# Patient Record
Sex: FEMALE | Race: ASIAN | NOT HISPANIC OR LATINO | Employment: UNEMPLOYED | URBAN - METROPOLITAN AREA
[De-identification: names, ages, dates, MRNs, and addresses within clinical notes are randomized per-mention and may not be internally consistent; named-entity substitution may affect disease eponyms.]

---

## 2019-08-26 ENCOUNTER — TRANSCRIBE ORDERS (OUTPATIENT)
Dept: ADMINISTRATIVE | Facility: HOSPITAL | Age: 49
End: 2019-08-26

## 2019-08-26 DIAGNOSIS — Z12.39 SCREENING BREAST EXAMINATION: Primary | ICD-10-CM

## 2019-11-05 ENCOUNTER — APPOINTMENT (OUTPATIENT)
Dept: MAMMOGRAPHY | Facility: HOSPITAL | Age: 49
End: 2019-11-05

## 2019-11-14 ENCOUNTER — HOSPITAL ENCOUNTER (OUTPATIENT)
Dept: MAMMOGRAPHY | Facility: HOSPITAL | Age: 49
Discharge: HOME | End: 2019-11-14
Admitting: NURSE PRACTITIONER

## 2019-11-14 DIAGNOSIS — Z12.39 SCREENING BREAST EXAMINATION: ICD-10-CM

## 2019-11-14 PROCEDURE — 77067 SCR MAMMO BI INCL CAD: CPT

## 2019-11-14 PROCEDURE — 77063 BREAST TOMOSYNTHESIS BI: CPT

## 2023-06-12 ENCOUNTER — CONSULT (OUTPATIENT)
Dept: ONCOLOGY | Facility: CLINIC | Age: 53
End: 2023-06-12
Payer: MEDICAID

## 2023-06-12 ENCOUNTER — LAB (OUTPATIENT)
Dept: LAB | Facility: HOSPITAL | Age: 53
End: 2023-06-12
Payer: MEDICAID

## 2023-06-12 VITALS
TEMPERATURE: 98.2 F | OXYGEN SATURATION: 96 % | HEART RATE: 74 BPM | SYSTOLIC BLOOD PRESSURE: 129 MMHG | WEIGHT: 163.6 LBS | DIASTOLIC BLOOD PRESSURE: 62 MMHG

## 2023-06-12 DIAGNOSIS — D72.829 LEUKOCYTOSIS, UNSPECIFIED TYPE: Primary | ICD-10-CM

## 2023-06-12 LAB
BASOPHILS # BLD AUTO: 0.02 10*3/MM3 (ref 0–0.2)
BASOPHILS NFR BLD AUTO: 0.2 % (ref 0–1.5)
DEPRECATED RDW RBC AUTO: 43.4 FL (ref 37–54)
EOSINOPHIL # BLD AUTO: 0.1 10*3/MM3 (ref 0–0.4)
EOSINOPHIL NFR BLD AUTO: 0.9 % (ref 0.3–6.2)
ERYTHROCYTE [DISTWIDTH] IN BLOOD BY AUTOMATED COUNT: 13.7 % (ref 12.3–15.4)
HCT VFR BLD AUTO: 41.9 % (ref 34–46.6)
HGB BLD-MCNC: 13.4 G/DL (ref 12–15.9)
IMM GRANULOCYTES # BLD AUTO: 0.03 10*3/MM3 (ref 0–0.05)
IMM GRANULOCYTES NFR BLD AUTO: 0.3 % (ref 0–0.5)
LYMPHOCYTES # BLD AUTO: 2.8 10*3/MM3 (ref 0.7–3.1)
LYMPHOCYTES NFR BLD AUTO: 25.1 % (ref 19.6–45.3)
MCH RBC QN AUTO: 27.7 PG (ref 26.6–33)
MCHC RBC AUTO-ENTMCNC: 32 G/DL (ref 31.5–35.7)
MCV RBC AUTO: 86.7 FL (ref 79–97)
MONOCYTES # BLD AUTO: 0.79 10*3/MM3 (ref 0.1–0.9)
MONOCYTES NFR BLD AUTO: 7.1 % (ref 5–12)
NEUTROPHILS NFR BLD AUTO: 66.4 % (ref 42.7–76)
NEUTROPHILS NFR BLD AUTO: 7.43 10*3/MM3 (ref 1.7–7)
NRBC BLD AUTO-RTO: 0 /100 WBC (ref 0–0.2)
PLATELET # BLD AUTO: 353 10*3/MM3 (ref 140–450)
PMV BLD AUTO: 12.3 FL (ref 6–12)
RBC # BLD AUTO: 4.83 10*6/MM3 (ref 3.77–5.28)
WBC NRBC COR # BLD: 11.17 10*3/MM3 (ref 3.4–10.8)

## 2023-06-12 PROCEDURE — 88184 FLOWCYTOMETRY/ TC 1 MARKER: CPT | Performed by: INTERNAL MEDICINE

## 2023-06-12 PROCEDURE — 88182 CELL MARKER STUDY: CPT | Performed by: INTERNAL MEDICINE

## 2023-06-12 PROCEDURE — 88185 FLOWCYTOMETRY/TC ADD-ON: CPT | Performed by: INTERNAL MEDICINE

## 2023-06-12 PROCEDURE — 85025 COMPLETE CBC W/AUTO DIFF WBC: CPT

## 2023-06-12 PROCEDURE — 36415 COLL VENOUS BLD VENIPUNCTURE: CPT

## 2023-06-12 NOTE — LETTER
June 12, 2023     NTETE Pham  [address]  [address]  [facility] [address]    Patient: Yaya Holland   YOB: 1970   Date of Visit: 6/12/2023       Dear NETTE Velazquez:    Thank you for referring Yaya Holland to me for evaluation. Below are the relevant portions of my assessment and plan of care.    If you have questions, please do not hesitate to call me. I look forward to following Yaya along with you.         Sincerely,        Allen Mehta MD        CC: No Recipients    Allen Mehta MD  06/12/23 1633  Sign when Signing Visit    Subjective    REASON FOR CONSULTATION: Mild chronic leukocytosis  Provide an opinion on any further workup or treatment                             REQUESTING PHYSICIAN: NETTE Bell    RECORDS OBTAINED:  Records of the patients history including those obtained from the referring provider were reviewed and summarized in detail.    HISTORY OF PRESENT ILLNESS:  The patient is a 52 y.o. year old female who is here for an opinion about the above issue.  She is originally from [facility].  She does not speak English and is deaf.  She was accompanied to the office today by her son who is fluent in English and acted as the .    She was referred to us for evaluation of mild chronic leukocytosis with a platelet count of around 10,000 to 13,000 the last couple of years.  The white cell differential appears normal.  She has not been acutely ill during this time.  Her son tells me that she does have arthritic knees and has been undergoing steroid injections to the knees every 3 months or so.  She is not a smoker.  She is not on any systemic steroid medication.    Her hemoglobin and platelets are normal and I reassured the son that I suspect this is a benign reactive leukocytosis and not likely to represent any serious hematologic disorder.    History of Present Illness     Past Medical History:   Diagnosis Date   • Depression    • GERD  (gastroesophageal reflux disease)    • Hyperlipidemia    • Hypertension    • Hypothyroidism         Past Surgical History:   Procedure Laterality Date   •  SECTION     • LAPAROSCOPIC CHOLECYSTECTOMY  2018        No current outpatient medications on file prior to visit.     No current facility-administered medications on file prior to visit.        ALLERGIES:  No Known Allergies     Social History     Socioeconomic History   • Marital status:    Tobacco Use   • Smoking status: Never   • Smokeless tobacco: Never   Substance and Sexual Activity   • Alcohol use: Not Currently        No family history on file.     Review of Systems   Constitutional:  Negative for activity change, chills, fatigue and fever.   HENT:  Positive for hearing loss. Negative for mouth sores, trouble swallowing and voice change.         Patient is deaf   Eyes:  Negative for pain and visual disturbance.   Respiratory:  Negative for cough, shortness of breath and wheezing.    Cardiovascular:  Negative for chest pain and palpitations.   Gastrointestinal:  Negative for abdominal pain, constipation, diarrhea, nausea and vomiting.   Genitourinary:  Negative for difficulty urinating, frequency and urgency.   Musculoskeletal:  Positive for arthralgias and joint swelling.   Skin:  Negative for rash.   Neurological:  Negative for dizziness, seizures, weakness and headaches.   Hematological:  Negative for adenopathy. Does not bruise/bleed easily.   Psychiatric/Behavioral:  Negative for behavioral problems and confusion. The patient is not nervous/anxious.       Objective    Vitals:    23 1555   BP: 129/62   Pulse: 74   Temp: 98.2 °F (36.8 °C)   TempSrc: Temporal   SpO2: 96%   Weight: 74.2 kg (163 lb 9.6 oz)   PainSc: 0-No pain   PainLoc: Comment: Sometimes her upper back hurts and she gets a feeling of being full         2023     3:53 PM   Current Status   ECOG score 0       Physical Exam  Constitutional:       General: She  is not in acute distress.     Appearance: She is well-developed.   HENT:      Head: Normocephalic.   Eyes:      General: No scleral icterus.     Conjunctiva/sclera: Conjunctivae normal.      Pupils: Pupils are equal, round, and reactive to light.   Neck:      Thyroid: No thyromegaly.      Vascular: No JVD.   Cardiovascular:      Rate and Rhythm: Normal rate and regular rhythm.      Heart sounds: No murmur heard.    No friction rub. No gallop.   Pulmonary:      Effort: Pulmonary effort is normal.      Breath sounds: Normal breath sounds. No wheezing or rales.   Abdominal:      General: There is no distension.      Palpations: Abdomen is soft. There is no mass.      Tenderness: There is no abdominal tenderness.   Musculoskeletal:         General: No deformity. Normal range of motion.      Cervical back: Normal range of motion and neck supple.   Lymphadenopathy:      Cervical: No cervical adenopathy.   Skin:     General: Skin is warm and dry.      Findings: No erythema or rash.   Neurological:      Mental Status: She is alert and oriented to person, place, and time.      Cranial Nerves: No cranial nerve deficit.      Deep Tendon Reflexes: Reflexes are normal and symmetric.   Psychiatric:         Behavior: Behavior normal.         Judgment: Judgment normal.         RECENT LABS:  Hematology WBC   Date Value Ref Range Status   06/12/2023 11.17 (H) 3.40 - 10.80 10*3/mm3 Final   04/16/2019 6.87 4.5 - 11.0 10*3/uL Final     RBC   Date Value Ref Range Status   06/12/2023 4.83 3.77 - 5.28 10*6/mm3 Final   04/16/2019 4.89 4.0 - 5.2 10*6/uL Final     Hemoglobin   Date Value Ref Range Status   06/12/2023 13.4 12.0 - 15.9 g/dL Final   04/16/2019 11.0 (L) 12.0 - 16.0 g/dL Final     Hematocrit   Date Value Ref Range Status   06/12/2023 41.9 34.0 - 46.6 % Final   04/16/2019 38.5 36.0 - 46.0 % Final     Platelets   Date Value Ref Range Status   06/12/2023 353 140 - 450 10*3/mm3 Final   04/16/2019 320 140 - 440 10*3/uL Final           Assessment & Plan    1.  Mild chronic leukocytosis with normal white cell differential and normal hemoglobin and platelet count.  I suspect this is a benign reactive leukocytosis.  2.  Chronic arthritis mainly affecting the knees.  She apparently has been receiving steroid injections which could be having an impact on her white blood cell count.  3.  Communication issues.  Patient does not speak English and she is deaf but her son is fluent in English and acted as an .    Recommendations  1.  We will perform additional laboratory evaluation to rule out other more worrisome causes of leukocytosis.  Labs will include peripheral blood flow cytometry to look for abnormal populations of white cells.  We will also check serum protein electrophoresis with immunofixation and markers of inflammation (erythrocyte sedimentation rate and C-reactive protein).  2.  I have asked the patient to return to the office in 2 weeks to review the results of the lab work-up above and also to have a repeat CBC at that time.    Thanks for allowing us to see this nice patient in consultation.

## 2023-06-12 NOTE — PROGRESS NOTES
Subjective     REASON FOR CONSULTATION: Mild chronic leukocytosis  Provide an opinion on any further workup or treatment                             REQUESTING PHYSICIAN: NETTE Bell    RECORDS OBTAINED:  Records of the patients history including those obtained from the referring provider were reviewed and summarized in detail.    HISTORY OF PRESENT ILLNESS:  The patient is a 52 y.o. year old female who is here for an opinion about the above issue.  She is originally from [facility].  She does not speak English and is deaf.  She was accompanied to the office today by her son who is fluent in English and acted as the .    She was referred to us for evaluation of mild chronic leukocytosis with a platelet count of around 10,000 to 13,000 the last couple of years.  The white cell differential appears normal.  She has not been acutely ill during this time.  Her son tells me that she does have arthritic knees and has been undergoing steroid injections to the knees every 3 months or so.  She is not a smoker.  She is not on any systemic steroid medication.    Her hemoglobin and platelets are normal and I reassured the son that I suspect this is a benign reactive leukocytosis and not likely to represent any serious hematologic disorder.    History of Present Illness     Past Medical History:   Diagnosis Date    Depression     GERD (gastroesophageal reflux disease)     Hyperlipidemia     Hypertension     Hypothyroidism         Past Surgical History:   Procedure Laterality Date     SECTION      LAPAROSCOPIC CHOLECYSTECTOMY  2018        No current outpatient medications on file prior to visit.     No current facility-administered medications on file prior to visit.        ALLERGIES:  No Known Allergies     Social History     Socioeconomic History    Marital status:    Tobacco Use    Smoking status: Never    Smokeless tobacco: Never   Substance and Sexual Activity    Alcohol use: Not Currently         No family history on file.     Review of Systems   Constitutional:  Negative for activity change, chills, fatigue and fever.   HENT:  Positive for hearing loss. Negative for mouth sores, trouble swallowing and voice change.         Patient is deaf   Eyes:  Negative for pain and visual disturbance.   Respiratory:  Negative for cough, shortness of breath and wheezing.    Cardiovascular:  Negative for chest pain and palpitations.   Gastrointestinal:  Negative for abdominal pain, constipation, diarrhea, nausea and vomiting.   Genitourinary:  Negative for difficulty urinating, frequency and urgency.   Musculoskeletal:  Positive for arthralgias and joint swelling.   Skin:  Negative for rash.   Neurological:  Negative for dizziness, seizures, weakness and headaches.   Hematological:  Negative for adenopathy. Does not bruise/bleed easily.   Psychiatric/Behavioral:  Negative for behavioral problems and confusion. The patient is not nervous/anxious.       Objective     Vitals:    06/12/23 1555   BP: 129/62   Pulse: 74   Temp: 98.2 °F (36.8 °C)   TempSrc: Temporal   SpO2: 96%   Weight: 74.2 kg (163 lb 9.6 oz)   PainSc: 0-No pain   PainLoc: Comment: Sometimes her upper back hurts and she gets a feeling of being full         6/12/2023     3:53 PM   Current Status   ECOG score 0       Physical Exam  Constitutional:       General: She is not in acute distress.     Appearance: She is well-developed.   HENT:      Head: Normocephalic.   Eyes:      General: No scleral icterus.     Conjunctiva/sclera: Conjunctivae normal.      Pupils: Pupils are equal, round, and reactive to light.   Neck:      Thyroid: No thyromegaly.      Vascular: No JVD.   Cardiovascular:      Rate and Rhythm: Normal rate and regular rhythm.      Heart sounds: No murmur heard.    No friction rub. No gallop.   Pulmonary:      Effort: Pulmonary effort is normal.      Breath sounds: Normal breath sounds. No wheezing or rales.   Abdominal:      General: There is  no distension.      Palpations: Abdomen is soft. There is no mass.      Tenderness: There is no abdominal tenderness.   Musculoskeletal:         General: No deformity. Normal range of motion.      Cervical back: Normal range of motion and neck supple.   Lymphadenopathy:      Cervical: No cervical adenopathy.   Skin:     General: Skin is warm and dry.      Findings: No erythema or rash.   Neurological:      Mental Status: She is alert and oriented to person, place, and time.      Cranial Nerves: No cranial nerve deficit.      Deep Tendon Reflexes: Reflexes are normal and symmetric.   Psychiatric:         Behavior: Behavior normal.         Judgment: Judgment normal.         RECENT LABS:  Hematology WBC   Date Value Ref Range Status   06/12/2023 11.17 (H) 3.40 - 10.80 10*3/mm3 Final   04/16/2019 6.87 4.5 - 11.0 10*3/uL Final     RBC   Date Value Ref Range Status   06/12/2023 4.83 3.77 - 5.28 10*6/mm3 Final   04/16/2019 4.89 4.0 - 5.2 10*6/uL Final     Hemoglobin   Date Value Ref Range Status   06/12/2023 13.4 12.0 - 15.9 g/dL Final   04/16/2019 11.0 (L) 12.0 - 16.0 g/dL Final     Hematocrit   Date Value Ref Range Status   06/12/2023 41.9 34.0 - 46.6 % Final   04/16/2019 38.5 36.0 - 46.0 % Final     Platelets   Date Value Ref Range Status   06/12/2023 353 140 - 450 10*3/mm3 Final   04/16/2019 320 140 - 440 10*3/uL Final          Assessment & Plan     1.  Mild chronic leukocytosis with normal white cell differential and normal hemoglobin and platelet count.  I suspect this is a benign reactive leukocytosis.  2.  Chronic arthritis mainly affecting the knees.  She apparently has been receiving steroid injections which could be having an impact on her white blood cell count.  3.  Communication issues.  Patient does not speak English and she is deaf but her son is fluent in English and acted as an .    Recommendations  1.  We will perform additional laboratory evaluation to rule out other more worrisome causes of  leukocytosis.  Labs will include peripheral blood flow cytometry to look for abnormal populations of white cells.  We will also check serum protein electrophoresis with immunofixation and markers of inflammation (erythrocyte sedimentation rate and C-reactive protein).  2.  I have asked the patient to return to the office in 2 weeks to review the results of the lab work-up above and also to have a repeat CBC at that time.    Thanks for allowing us to see this nice patient in consultation.

## 2023-06-13 ENCOUNTER — PRIOR AUTHORIZATION (OUTPATIENT)
Dept: ONCOLOGY | Facility: CLINIC | Age: 53
End: 2023-06-13
Payer: MEDICAID

## 2023-06-13 LAB
ALBUMIN SERPL ELPH-MCNC: 3.7 G/DL (ref 2.9–4.4)
ALBUMIN/GLOB SERPL: 1.1 {RATIO} (ref 0.7–1.7)
ALPHA1 GLOB SERPL ELPH-MCNC: 0.3 G/DL (ref 0–0.4)
ALPHA2 GLOB SERPL ELPH-MCNC: 0.8 G/DL (ref 0.4–1)
B-GLOBULIN SERPL ELPH-MCNC: 1.3 G/DL (ref 0.7–1.3)
CORTIS SERPL-MCNC: 3.2 UG/DL (ref 6.2–19.4)
CRP SERPL-MCNC: 1 MG/L (ref 0–10)
ERYTHROCYTE [SEDIMENTATION RATE] IN BLOOD BY WESTERGREN METHOD: 32 MM/HR (ref 0–40)
GAMMA GLOB SERPL ELPH-MCNC: 1.1 G/DL (ref 0.4–1.8)
GLOBULIN SER-MCNC: 3.5 G/DL (ref 2.2–3.9)
IGA SERPL-MCNC: 278 MG/DL (ref 87–352)
IGG SERPL-MCNC: 1089 MG/DL (ref 586–1602)
IGM SERPL-MCNC: 42 MG/DL (ref 26–217)
INTERPRETATION SERPL IEP-IMP: ABNORMAL
KAPPA LC FREE SER-MCNC: 19.3 MG/L (ref 3.3–19.4)
KAPPA LC FREE/LAMBDA FREE SER: 1.93 {RATIO} (ref 0.26–1.65)
LABORATORY COMMENT REPORT: ABNORMAL
LAMBDA LC FREE SERPL-MCNC: 10 MG/L (ref 5.7–26.3)
M PROTEIN SERPL ELPH-MCNC: ABNORMAL G/DL
PROT SERPL-MCNC: 7.2 G/DL (ref 6–8.5)

## 2023-06-13 NOTE — TELEPHONE ENCOUNTER
No PA required for Flow 45810, 41681, and 90258 per Medicaid at [phone]. Ref # [id number]. Ok'd lab to send to [facility].

## 2023-06-15 LAB — REF LAB TEST METHOD: NORMAL
